# Patient Record
Sex: FEMALE | Race: WHITE | NOT HISPANIC OR LATINO | Employment: FULL TIME | URBAN - METROPOLITAN AREA
[De-identification: names, ages, dates, MRNs, and addresses within clinical notes are randomized per-mention and may not be internally consistent; named-entity substitution may affect disease eponyms.]

---

## 2023-05-04 ENCOUNTER — TRANSCRIBE ORDERS (OUTPATIENT)
Dept: OBSTETRICS AND GYNECOLOGY | Facility: HOSPITAL | Age: 28
End: 2023-05-04
Payer: COMMERCIAL

## 2023-05-04 DIAGNOSIS — O28.3 ECHOGENIC INTRACARDIAC FOCUS OF FETUS ON PRENATAL ULTRASOUND: ICD-10-CM

## 2023-05-04 DIAGNOSIS — Z34.90 PREGNANCY, UNSPECIFIED GESTATIONAL AGE: ICD-10-CM

## 2023-05-04 DIAGNOSIS — O28.3 ABNORMAL FETAL ULTRASOUND: ICD-10-CM

## 2023-05-04 DIAGNOSIS — O35.03X0 CHOROID PLEXUS CYST OF FETUS AFFECTING CARE OF MOTHER, ANTEPARTUM, SINGLE OR UNSPECIFIED FETUS: Primary | ICD-10-CM

## 2023-05-15 ENCOUNTER — OFFICE VISIT (OUTPATIENT)
Dept: OBSTETRICS AND GYNECOLOGY | Facility: HOSPITAL | Age: 28
End: 2023-05-15
Payer: COMMERCIAL

## 2023-05-15 ENCOUNTER — HOSPITAL ENCOUNTER (OUTPATIENT)
Dept: WOMENS IMAGING | Facility: HOSPITAL | Age: 28
Discharge: HOME | End: 2023-05-15
Admitting: NURSE PRACTITIONER
Payer: COMMERCIAL

## 2023-05-15 VITALS
DIASTOLIC BLOOD PRESSURE: 54 MMHG | HEIGHT: 60 IN | SYSTOLIC BLOOD PRESSURE: 99 MMHG | WEIGHT: 117.6 LBS | BODY MASS INDEX: 23.09 KG/M2

## 2023-05-15 DIAGNOSIS — G93.0 CHOROID PLEXUS CYST: Primary | ICD-10-CM

## 2023-05-15 DIAGNOSIS — Z34.90 PREGNANCY, UNSPECIFIED GESTATIONAL AGE: ICD-10-CM

## 2023-05-15 DIAGNOSIS — O28.3 ECHOGENIC INTRACARDIAC FOCUS OF FETUS ON PRENATAL ULTRASOUND: ICD-10-CM

## 2023-05-15 DIAGNOSIS — O35.03X0 CHOROID PLEXUS CYST OF FETUS AFFECTING CARE OF MOTHER, ANTEPARTUM, SINGLE OR UNSPECIFIED FETUS: ICD-10-CM

## 2023-05-15 DIAGNOSIS — O28.3 ABNORMAL FETAL ULTRASOUND: ICD-10-CM

## 2023-05-15 PROCEDURE — 76811 OB US DETAILED SNGL FETUS: CPT

## 2023-05-15 RX ORDER — PRENATAL VIT/IRON FUM/FOLIC AC 27MG-0.8MG
TABLET ORAL DAILY
COMMUNITY

## 2023-05-15 NOTE — PROGRESS NOTES
"    Maternal/Fetal Medicine New Patient Note     Name: Mildred Hickey    : 1995     MRN: [id number]     Referring Provider: DEWAYNE Quintana    Chief Complaint  BILateral CPC    Subjective     History of Present Illness:  Mildred Hickey is a 27 y.o.  20w3d who presents today for evaluation in the setting of bilateral CPC and EIF noted on outside ultrasound   NIPS low risk     MARÍA: Estimated Date of Delivery: 23     ROS:   As noted in HPI.     Past Medical History:   Diagnosis Date   • Mitral valve prolapse     passed out alot   • Neurocardiogenic syncope       Past Surgical History:   Procedure Laterality Date   • TONSILLECTOMY     • WISDOM TOOTH EXTRACTION        OB History        3    Para   2    Term   2       0    AB   0    Living   2       SAB   0    IAB   0    Ectopic   0    Molar   0    Multiple   0    Live Births   2          Obstetric Comments   Fob #1 - Pregnancy #1   Fob #2 - Pregnancy #2 -  fob murdered   Fob #3 - Pregnancy #3 - NIPT - wnl - male              Objective     Vital Signs  BP 99/54   Ht 152.4 cm (60\")   Wt 53.3 kg (117 lb 9.6 oz)   LMP  (LMP Unknown)   Estimated body mass index is 22.97 kg/m² as calculated from the following:    Height as of this encounter: 152.4 cm (60\").    Weight as of this encounter: 53.3 kg (117 lb 9.6 oz).    Ultrasound Impression:   See Viewpoint.   Isolated unilateral CPC and EIF noted     Assessment and Plan     Diagnoses and all orders for this visit:    1. Choroid plexus cyst (Primary)    2. Echogenic intracardiac focus of fetus on prenatal ultrasound  Assessment & Plan:  We discussed that an echogenic intra-cardiac focus in the heart is a common finding in normal fetuses, and that there are no cardiac implications. I explained this has been weakly associated with fetal aneuploidy. In an otherwise low risk patient with no other markers of aneuploidy, this is likely a benign finding.      I reviewed choroid plexus " cysts, including their frequency, usual resolution, reported association with aneuploidy, and that no other associated  problems are known.     We reviewed her low risk NIPS as well as the risks and benefits of amniocentesis   The patient declines further genetic screening at this time     Follow up 8 weeks is scheduled          Follow Up  8 weeks     I spent 30 minutes caring for the patient on the day of service. This included: obtaining or reviewing a separately obtained medical history, reviewing patient records, performing a medically appropriate exam and/or evaluation, counseling or educating the patient/family/caregiver, ordering medications, labs, and/or procedures and documenting such in the medical record. This does not include time spent on review and interpretation of other tests such as fetal ultrasound or the performance of other procedures such as amniocentesis or CVS.      Ann Ibrahim MD  05/15/2023

## 2023-05-15 NOTE — ASSESSMENT & PLAN NOTE
We discussed that an echogenic intra-cardiac focus in the heart is a common finding in normal fetuses, and that there are no cardiac implications. I explained this has been weakly associated with fetal aneuploidy. In an otherwise low risk patient with no other markers of aneuploidy, this is likely a benign finding.      I reviewed choroid plexus cysts, including their frequency, usual resolution, reported association with aneuploidy, and that no other associated  problems are known.     We reviewed her low risk NIPS as well as the risks and benefits of amniocentesis   The patient declines further genetic screening at this time     Follow up 8 weeks is scheduled

## 2023-05-15 NOTE — LETTER
"May 15, 2023       No Recipients    Patient: Mildred Hickey   YOB: 1995   Date of Visit: 5/15/2023       Dear DEWAYNE Quintana,    Thank you for referring Mildred Hickey to me for evaluation. Below is a copy of my consult note.    If you have questions, please do not hesitate to call me. I look forward to following Mildred along with you.         Sincerely,        Ann Ibrahim MD        CC:   No Recipients    No complaint today, Next f/u in 2 weeks with SRIKANTH blasT - wnl - male         Maternal/Fetal Medicine New Patient Note     Name: Mildred Hickey    : 1995     MRN: [id number]     Referring Provider: DEWAYNE Quintana    Chief Complaint  BILateral CPC    Subjective     History of Present Illness:  Mildred Hickey is a 27 y.o.  20w3d who presents today for evaluation in the setting of bilateral CPC and EIF noted on outside ultrasound   NIPS low risk     MARÍA: Estimated Date of Delivery: 23     ROS:   As noted in HPI.     Past Medical History:   Diagnosis Date   • Mitral valve prolapse     passed out alot   • Neurocardiogenic syncope       Past Surgical History:   Procedure Laterality Date   • TONSILLECTOMY     • WISDOM TOOTH EXTRACTION        OB History          3    Para   2    Term   2       0    AB   0    Living   2         SAB   0    IAB   0    Ectopic   0    Molar   0    Multiple   0    Live Births   2          Obstetric Comments   Fob #1 - Pregnancy #1   Fob #2 - Pregnancy #2 -  fob murdered   Fob #3 - Pregnancy #3 - NIPT - wnl - male                Objective     Vital Signs  BP 99/54   Ht 152.4 cm (60\")   Wt 53.3 kg (117 lb 9.6 oz)   LMP  (LMP Unknown)   Estimated body mass index is 22.97 kg/m² as calculated from the following:    Height as of this encounter: 152.4 cm (60\").    Weight as of this encounter: 53.3 kg (117 lb 9.6 oz).    Ultrasound Impression:   See Viewpoint.   Isolated unilateral CPC and EIF noted     Assessment and " Plan     Diagnoses and all orders for this visit:    1. Choroid plexus cyst (Primary)    2. Echogenic intracardiac focus of fetus on prenatal ultrasound  Assessment & Plan:  We discussed that an echogenic intra-cardiac focus in the heart is a common finding in normal fetuses, and that there are no cardiac implications. I explained this has been weakly associated with fetal aneuploidy. In an otherwise low risk patient with no other markers of aneuploidy, this is likely a benign finding.      I reviewed choroid plexus cysts, including their frequency, usual resolution, reported association with aneuploidy, and that no other associated  problems are known.     We reviewed her low risk NIPS as well as the risks and benefits of amniocentesis   The patient declines further genetic screening at this time     Follow up 8 weeks is scheduled          Follow Up  8 weeks     I spent 30 minutes caring for the patient on the day of service. This included: obtaining or reviewing a separately obtained medical history, reviewing patient records, performing a medically appropriate exam and/or evaluation, counseling or educating the patient/family/caregiver, ordering medications, labs, and/or procedures and documenting such in the medical record. This does not include time spent on review and interpretation of other tests such as fetal ultrasound or the performance of other procedures such as amniocentesis or CVS.      Ann Ibrahim MD  05/15/2023